# Patient Record
Sex: FEMALE | Race: WHITE | URBAN - METROPOLITAN AREA
[De-identification: names, ages, dates, MRNs, and addresses within clinical notes are randomized per-mention and may not be internally consistent; named-entity substitution may affect disease eponyms.]

---

## 2019-05-10 ENCOUNTER — NEW PATIENT (OUTPATIENT)
Dept: URBAN - METROPOLITAN AREA CLINIC 85 | Facility: CLINIC | Age: 79
End: 2019-05-10
Payer: COMMERCIAL

## 2019-05-10 DIAGNOSIS — Z79.84 LONG TERM (CURRENT) USE OF ORAL ANTIDIABETIC DRUGS: ICD-10-CM

## 2019-05-10 DIAGNOSIS — H35.3132 NONEXUDATIVE MACULAR DEGENERATION, INTERMEDIATE DRY STAGE, BILATERAL: Primary | ICD-10-CM

## 2019-05-10 PROCEDURE — 92134 CPTRZ OPH DX IMG PST SGM RTA: CPT | Performed by: OPHTHALMOLOGY

## 2019-05-10 PROCEDURE — 92004 COMPRE OPH EXAM NEW PT 1/>: CPT | Performed by: OPHTHALMOLOGY

## 2019-05-10 ASSESSMENT — VISUAL ACUITY
OD: 20/100
OS: 20/200

## 2019-05-10 ASSESSMENT — INTRAOCULAR PRESSURE
OS: 13
OD: 13

## 2021-05-26 ENCOUNTER — OFFICE VISIT (OUTPATIENT)
Dept: URBAN - METROPOLITAN AREA CLINIC 85 | Facility: CLINIC | Age: 81
End: 2021-05-26
Payer: COMMERCIAL

## 2021-05-26 DIAGNOSIS — H35.3124 NEXDTVE AGE-REL MCLR DEGN, L EYE, ADV ATRPC WITH SBFVL INVL: ICD-10-CM

## 2021-05-26 DIAGNOSIS — H35.3211 EXDTVE AGE-REL MCLR DEGN, RIGHT EYE, WITH ACTV CHRDL NEOVAS: Primary | ICD-10-CM

## 2021-05-26 PROCEDURE — 92014 COMPRE OPH EXAM EST PT 1/>: CPT | Performed by: OPHTHALMOLOGY

## 2021-05-26 PROCEDURE — 92134 CPTRZ OPH DX IMG PST SGM RTA: CPT | Performed by: OPHTHALMOLOGY

## 2021-05-26 ASSESSMENT — KERATOMETRY
OD: 44.13
OS: 44.00

## 2021-05-26 ASSESSMENT — VISUAL ACUITY
OS: 20/400
OD: 20/400

## 2021-05-26 NOTE — IMPRESSION/PLAN
Impression: Exdtve age-rel mclr degn, right eye, with actv chrdl neovas: H35.3219. Plan: Discussed finding and risks of untreated Wet ARMD.  Discussed importance of referral to retinal specialist for evaluation urgently and determination of whether treatment is appropriate.

## 2021-05-26 NOTE — IMPRESSION/PLAN
Impression: Type 2 diab w mild nonprlf diabetic rtnop w/o macular edema, bilateral: Z58.2675. Plan: Diabetes type II: mild background diabetic retinopathy, no signs of neovascularization noted. No treatment necessary at this time. Patient was instructed to monitor vision for sudden changes and to call if visual changes noted. Discussed ocular and systemic benefits of blood sugar control.

## 2021-05-26 NOTE — IMPRESSION/PLAN
Impression: Nexdtve age-rel mclr sara, l eye, adv atrpc with sbfvl invl: R32.1342. Plan: Macular degeneration dry type. Patient was instructed on the use of the Amsler grid and will continue monitoring vision. Discussed variable rate of progression and potential of conversion to wet type. In the event condition converts to wet form, patient understands need for consult with retinal specialist regarding potential intervention. Discussed increased risk with smoking.  recommends taking AREDS II vitamins and antioxidants, provided the patient's primary care doctor approves  (They have been shown to have the potential of slowing the progression of macular degeneration.)  Patient given outline of AREDS II recommendations to give to primary care doctor before taking supplement. Patient given Amsler grid and instructed on proper use to monitor vision daily. Patient was told to call office immediately if patient experiences vision and/or Amsler grid changes.

## 2021-06-28 ENCOUNTER — OFFICE VISIT (OUTPATIENT)
Dept: URBAN - METROPOLITAN AREA CLINIC 83 | Facility: CLINIC | Age: 81
End: 2021-06-28
Payer: COMMERCIAL

## 2021-06-28 DIAGNOSIS — H35.3134 NONEXUDATIVE AGE-RELATED MACULAR DEGENERATION, BILATERAL, ADVANCED ATROPHIC WITH SUBFOVEAL INVOLVEMENT: Primary | ICD-10-CM

## 2021-06-28 DIAGNOSIS — Z96.1 PRESENCE OF INTRAOCULAR LENS: ICD-10-CM

## 2021-06-28 DIAGNOSIS — E11.3293 TYPE 2 DIAB WITH MILD NONP RTNOP WITHOUT MACULAR EDEMA, BI: ICD-10-CM

## 2021-06-28 DIAGNOSIS — H43.813 VITREOUS DEGENERATION, BILATERAL: ICD-10-CM

## 2021-06-28 PROCEDURE — 92235 FLUORESCEIN ANGRPH MLTIFRAME: CPT | Performed by: OPHTHALMOLOGY

## 2021-06-28 PROCEDURE — 92134 CPTRZ OPH DX IMG PST SGM RTA: CPT | Performed by: OPHTHALMOLOGY

## 2021-06-28 PROCEDURE — 99204 OFFICE O/P NEW MOD 45 MIN: CPT | Performed by: OPHTHALMOLOGY

## 2021-06-28 ASSESSMENT — INTRAOCULAR PRESSURE
OS: 15
OD: 19

## 2021-06-28 NOTE — IMPRESSION/PLAN
Impression: Presence of intraocular lens: Z96.1.  Bilateral. Plan: --Stable and centered OU, monitor

## 2021-06-28 NOTE — IMPRESSION/PLAN
Impression: Nonexudative age-related macular degeneration, bilateral, advanced atrophic with subfoveal involvement: H35.3134. Bilateral. Plan: --findings/diagnosis discussed with patient in detail
--no evidence of CNV on exam/OCT OU
--FA shows window defect OU, no leakage c/w CNV
--geographic RPE atrophy will limit visual potential
--AREDS-2 vitamins and Amsler grid self-monitoring reviewed
--dietary recs, avoid smoking, UV protection discussed --pt to call with s/s decreased vision/metamorphopsia RTC 6 months for OCT OU re-eval

## 2021-06-28 NOTE — IMPRESSION/PLAN
Impression: Type 2 diab with mild nonp rtnop without macular edema, bi: P91.6124 Bilateral. Plan: --findings/diagnosis d/w pt in detail
--OCT shows no e/o macular edema OU
--treatment not indicated at this time
--importance of tight BS/BP/lipid control discussed
--coordinate care with PCP to reduce systemic complications of DM